# Patient Record
Sex: MALE | Race: WHITE | Employment: FULL TIME | URBAN - NONMETROPOLITAN AREA
[De-identification: names, ages, dates, MRNs, and addresses within clinical notes are randomized per-mention and may not be internally consistent; named-entity substitution may affect disease eponyms.]

---

## 2024-01-21 ENCOUNTER — HOSPITAL ENCOUNTER (INPATIENT)
Age: 44
LOS: 1 days | Discharge: HOME | DRG: 322 | End: 2024-01-22
Attending: INTERNAL MEDICINE | Admitting: INTERNAL MEDICINE
Payer: COMMERCIAL

## 2024-01-21 DIAGNOSIS — I21.11 ST ELEVATION MYOCARDIAL INFARCTION INVOLVING RIGHT CORONARY ARTERY (HCC): Primary | ICD-10-CM

## 2024-01-21 DIAGNOSIS — I21.3 STEMI (ST ELEVATION MYOCARDIAL INFARCTION) (HCC): ICD-10-CM

## 2024-01-21 LAB
ACTIVATED CLOTTING TIME: 239 SECONDS (ref 1–150)
BASOPHILS ABSOLUTE: 0 THOU/MM3 (ref 0–0.1)
BASOPHILS NFR BLD AUTO: 0.2 %
DEPRECATED MEAN GLUCOSE BLD GHB EST-ACNC: 96 MG/DL (ref 70–126)
DEPRECATED RDW RBC AUTO: 45.1 FL (ref 35–45)
EKG ATRIAL RATE: 49 BPM
EKG P AXIS: 54 DEGREES
EKG P-R INTERVAL: 162 MS
EKG Q-T INTERVAL: 454 MS
EKG QRS DURATION: 96 MS
EKG QTC CALCULATION (BAZETT): 410 MS
EKG R AXIS: 50 DEGREES
EKG T AXIS: 77 DEGREES
EKG VENTRICULAR RATE: 49 BPM
EOSINOPHIL NFR BLD AUTO: 2.2 %
EOSINOPHILS ABSOLUTE: 0.2 THOU/MM3 (ref 0–0.4)
ERYTHROCYTE [DISTWIDTH] IN BLOOD BY AUTOMATED COUNT: 13.3 % (ref 11.5–14.5)
HBA1C MFR BLD HPLC: 5.2 % (ref 4.4–6.4)
HCT VFR BLD AUTO: 48.9 % (ref 42–52)
HGB BLD-MCNC: 16.2 GM/DL (ref 14–18)
IMM GRANULOCYTES # BLD AUTO: 0.02 THOU/MM3 (ref 0–0.07)
IMM GRANULOCYTES NFR BLD AUTO: 0.2 %
LYMPHOCYTES ABSOLUTE: 2.1 THOU/MM3 (ref 1–4.8)
LYMPHOCYTES NFR BLD AUTO: 25.2 %
MAGNESIUM SERPL-MCNC: 2.3 MG/DL (ref 1.6–2.4)
MCH RBC QN AUTO: 30.3 PG (ref 26–33)
MCHC RBC AUTO-ENTMCNC: 33.1 GM/DL (ref 32.2–35.5)
MCV RBC AUTO: 91.6 FL (ref 80–94)
MONOCYTES ABSOLUTE: 0.6 THOU/MM3 (ref 0.4–1.3)
MONOCYTES NFR BLD AUTO: 6.7 %
MRSA DNA SPEC QL NAA+PROBE: NEGATIVE
NEUTROPHILS NFR BLD AUTO: 65.5 %
NRBC BLD AUTO-RTO: 0 /100 WBC
PLATELET # BLD AUTO: 178 THOU/MM3 (ref 130–400)
PMV BLD AUTO: 9.8 FL (ref 9.4–12.4)
RBC # BLD AUTO: 5.34 MILL/MM3 (ref 4.7–6.1)
SEGMENTED NEUTROPHILS ABSOLUTE COUNT: 5.4 THOU/MM3 (ref 1.8–7.7)
TSH SERPL DL<=0.005 MIU/L-ACNC: 0.69 UIU/ML (ref 0.4–4.2)
WBC # BLD AUTO: 8.3 THOU/MM3 (ref 4.8–10.8)

## 2024-01-21 PROCEDURE — 87070 CULTURE OTHR SPECIMN AEROBIC: CPT

## 2024-01-21 PROCEDURE — B2111ZZ FLUOROSCOPY OF MULTIPLE CORONARY ARTERIES USING LOW OSMOLAR CONTRAST: ICD-10-PCS | Performed by: STUDENT IN AN ORGANIZED HEALTH CARE EDUCATION/TRAINING PROGRAM

## 2024-01-21 PROCEDURE — 99152 MOD SED SAME PHYS/QHP 5/>YRS: CPT | Performed by: INTERNAL MEDICINE

## 2024-01-21 PROCEDURE — C1769 GUIDE WIRE: HCPCS | Performed by: INTERNAL MEDICINE

## 2024-01-21 PROCEDURE — 93010 ELECTROCARDIOGRAM REPORT: CPT | Performed by: NUCLEAR MEDICINE

## 2024-01-21 PROCEDURE — C1874 STENT, COATED/COV W/DEL SYS: HCPCS | Performed by: INTERNAL MEDICINE

## 2024-01-21 PROCEDURE — 93458 L HRT ARTERY/VENTRICLE ANGIO: CPT | Performed by: INTERNAL MEDICINE

## 2024-01-21 PROCEDURE — 93005 ELECTROCARDIOGRAM TRACING: CPT | Performed by: INTERNAL MEDICINE

## 2024-01-21 PROCEDURE — 2580000003 HC RX 258: Performed by: INTERNAL MEDICINE

## 2024-01-21 PROCEDURE — 83735 ASSAY OF MAGNESIUM: CPT

## 2024-01-21 PROCEDURE — 2000000000 HC ICU R&B

## 2024-01-21 PROCEDURE — 6360000004 HC RX CONTRAST MEDICATION: Performed by: INTERNAL MEDICINE

## 2024-01-21 PROCEDURE — 027034Z DILATION OF CORONARY ARTERY, ONE ARTERY WITH DRUG-ELUTING INTRALUMINAL DEVICE, PERCUTANEOUS APPROACH: ICD-10-PCS | Performed by: STUDENT IN AN ORGANIZED HEALTH CARE EDUCATION/TRAINING PROGRAM

## 2024-01-21 PROCEDURE — 85347 COAGULATION TIME ACTIVATED: CPT

## 2024-01-21 PROCEDURE — 99223 1ST HOSP IP/OBS HIGH 75: CPT | Performed by: INTERNAL MEDICINE

## 2024-01-21 PROCEDURE — 83036 HEMOGLOBIN GLYCOSYLATED A1C: CPT

## 2024-01-21 PROCEDURE — C1725 CATH, TRANSLUMIN NON-LASER: HCPCS | Performed by: INTERNAL MEDICINE

## 2024-01-21 PROCEDURE — 6360000002 HC RX W HCPCS: Performed by: INTERNAL MEDICINE

## 2024-01-21 PROCEDURE — 85025 COMPLETE CBC W/AUTO DIFF WBC: CPT

## 2024-01-21 PROCEDURE — C9606 PERC D-E COR REVASC W AMI S: HCPCS | Performed by: INTERNAL MEDICINE

## 2024-01-21 PROCEDURE — 92941 PRQ TRLML REVSC TOT OCCL AMI: CPT | Performed by: INTERNAL MEDICINE

## 2024-01-21 PROCEDURE — 87641 MR-STAPH DNA AMP PROBE: CPT

## 2024-01-21 PROCEDURE — 84443 ASSAY THYROID STIM HORMONE: CPT

## 2024-01-21 PROCEDURE — 6370000000 HC RX 637 (ALT 250 FOR IP): Performed by: INTERNAL MEDICINE

## 2024-01-21 PROCEDURE — 99291 CRITICAL CARE FIRST HOUR: CPT | Performed by: INTERNAL MEDICINE

## 2024-01-21 PROCEDURE — 2709999900 HC NON-CHARGEABLE SUPPLY: Performed by: INTERNAL MEDICINE

## 2024-01-21 PROCEDURE — C1887 CATHETER, GUIDING: HCPCS | Performed by: INTERNAL MEDICINE

## 2024-01-21 PROCEDURE — 2500000003 HC RX 250 WO HCPCS: Performed by: INTERNAL MEDICINE

## 2024-01-21 PROCEDURE — C1894 INTRO/SHEATH, NON-LASER: HCPCS | Performed by: INTERNAL MEDICINE

## 2024-01-21 PROCEDURE — 99153 MOD SED SAME PHYS/QHP EA: CPT | Performed by: INTERNAL MEDICINE

## 2024-01-21 PROCEDURE — 4A023N7 MEASUREMENT OF CARDIAC SAMPLING AND PRESSURE, LEFT HEART, PERCUTANEOUS APPROACH: ICD-10-PCS | Performed by: STUDENT IN AN ORGANIZED HEALTH CARE EDUCATION/TRAINING PROGRAM

## 2024-01-21 DEVICE — STENT ONYXNG35008UX ONYX 3.50X08RX
Type: IMPLANTABLE DEVICE | Status: FUNCTIONAL
Brand: ONYX FRONTIER™

## 2024-01-21 RX ORDER — SODIUM CHLORIDE 0.9 % (FLUSH) 0.9 %
5-40 SYRINGE (ML) INJECTION PRN
Status: DISCONTINUED | OUTPATIENT
Start: 2024-01-21 | End: 2024-01-22 | Stop reason: HOSPADM

## 2024-01-21 RX ORDER — TESTOSTERONE CYPIONATE 200 MG/ML
200 INJECTION, SOLUTION INTRAMUSCULAR WEEKLY
COMMUNITY
Start: 2022-10-28

## 2024-01-21 RX ORDER — METOPROLOL SUCCINATE 25 MG/1
25 TABLET, EXTENDED RELEASE ORAL DAILY
Status: DISCONTINUED | OUTPATIENT
Start: 2024-01-21 | End: 2024-01-22 | Stop reason: HOSPADM

## 2024-01-21 RX ORDER — ACETAMINOPHEN 325 MG/1
650 TABLET ORAL EVERY 4 HOURS PRN
Status: DISCONTINUED | OUTPATIENT
Start: 2024-01-21 | End: 2024-01-22 | Stop reason: HOSPADM

## 2024-01-21 RX ORDER — ATORVASTATIN CALCIUM 80 MG/1
80 TABLET, FILM COATED ORAL NIGHTLY
Status: DISCONTINUED | OUTPATIENT
Start: 2024-01-21 | End: 2024-01-22 | Stop reason: HOSPADM

## 2024-01-21 RX ORDER — SODIUM CHLORIDE 0.9 % (FLUSH) 0.9 %
5-40 SYRINGE (ML) INJECTION EVERY 12 HOURS SCHEDULED
Status: DISCONTINUED | OUTPATIENT
Start: 2024-01-21 | End: 2024-01-22 | Stop reason: HOSPADM

## 2024-01-21 RX ORDER — DEXTROAMPHETAMINE SACCHARATE, AMPHETAMINE ASPARTATE MONOHYDRATE, DEXTROAMPHETAMINE SULFATE AND AMPHETAMINE SULFATE 5; 5; 5; 5 MG/1; MG/1; MG/1; MG/1
2 CAPSULE, EXTENDED RELEASE ORAL DAILY
COMMUNITY
Start: 2024-01-17

## 2024-01-21 RX ORDER — NITROGLYCERIN 20 MG/100ML
INJECTION INTRAVENOUS CONTINUOUS PRN
Status: COMPLETED | OUTPATIENT
Start: 2024-01-21 | End: 2024-01-21

## 2024-01-21 RX ORDER — HEPARIN SODIUM 1000 [USP'U]/ML
INJECTION, SOLUTION INTRAVENOUS; SUBCUTANEOUS PRN
Status: DISCONTINUED | OUTPATIENT
Start: 2024-01-21 | End: 2024-01-21 | Stop reason: HOSPADM

## 2024-01-21 RX ORDER — ASPIRIN 81 MG/1
81 TABLET, CHEWABLE ORAL DAILY
Status: DISCONTINUED | OUTPATIENT
Start: 2024-01-22 | End: 2024-01-22 | Stop reason: HOSPADM

## 2024-01-21 RX ORDER — DIPHENHYDRAMINE HYDROCHLORIDE 50 MG/ML
INJECTION INTRAMUSCULAR; INTRAVENOUS PRN
Status: DISCONTINUED | OUTPATIENT
Start: 2024-01-21 | End: 2024-01-21 | Stop reason: HOSPADM

## 2024-01-21 RX ORDER — ROSUVASTATIN CALCIUM 20 MG/1
20 TABLET, COATED ORAL DAILY
Status: ON HOLD | COMMUNITY
Start: 2022-10-19 | End: 2024-01-22 | Stop reason: HOSPADM

## 2024-01-21 RX ORDER — SODIUM CHLORIDE 9 MG/ML
INJECTION, SOLUTION INTRAVENOUS PRN
Status: DISCONTINUED | OUTPATIENT
Start: 2024-01-21 | End: 2024-01-22 | Stop reason: HOSPADM

## 2024-01-21 RX ADMIN — SODIUM CHLORIDE, PRESERVATIVE FREE 10 ML: 5 INJECTION INTRAVENOUS at 20:08

## 2024-01-21 RX ADMIN — TICAGRELOR 90 MG: 90 TABLET ORAL at 20:07

## 2024-01-21 RX ADMIN — METOPROLOL SUCCINATE 25 MG: 25 TABLET, EXTENDED RELEASE ORAL at 10:26

## 2024-01-21 RX ADMIN — ATORVASTATIN CALCIUM 80 MG: 80 TABLET, FILM COATED ORAL at 20:07

## 2024-01-21 NOTE — H&P
[facility]  H and P    Patient's Name/Date of Birth: Soham Reyez / 1980 (43 y.o.)    Date: January 21, 2024     Referring Provider: Osman Heck MD    CHIEF COMPLAINT: STEMI      HPI: This is a pleasant 43 y.o. male with hx of prior drug use on suboxone who presents for evaluation of chest pain.  He has had chest pain since 3 AM with some mild radiation.  Did have jaw pain as well.  No sob or diaphoresis.  No nausea.  ECG has been showing evolving ST changes in the inferior leads.  Around 5 AM, there was clear ST elevation with bradycardia.  No LOC or syncope.  Was given Heparin IV, ASA.  No prior MI or stents.  No contrast allergy.  No pleurisy, f/c/ns.  Uses MJ.  Cr 1.3.   Trop negative. Still having chest discomfort.         Echo: No results found for this or any previous visit.       All labs, EKG's, diagnostic testing and images as well as cardiac cath, stress testing were reviewed during this encounter    Past Medical History:   Diagnosis Date    Psychiatric problem      Past Surgical History:   Procedure Laterality Date    APPENDECTOMY       No current facility-administered medications for this encounter.     Prior to Admission medications    Medication Sig Start Date End Date Taking? Authorizing Provider   citalopram (CELEXA) 20 MG tablet Take 1 tablet by mouth daily. 9/20/13   Mark Stevenson MD   traZODone (DESYREL) 100 MG tablet Take 1 tablet by mouth nightly. 9/20/13   Mark Stevenson MD   folic acid (FOLVITE) 1 MG tablet Take 1 tablet by mouth daily. 9/20/13   Mark Stevenson MD   therapeutic multivitamin-minerals (THERAGRAN-M) tablet Take 1 tablet by mouth daily. 9/20/13   Mark Stevenson MD   pantoprazole (PROTONIX) 40 MG tablet Take 1 tablet by mouth every morning (before breakfast). 9/20/13   Mark Stevenson MD   vitamin B-1 100 MG tablet Take 1 tablet by mouth daily. 9/20/13   Mark Stevenson MD   Scheduled Meds:  Continuous Infusions:  PRN 
Patient:  Soham Reyez    Unit/Bed:4D-16/016-A  MRN: [id number]   PCP: No primary care provider on file.  Date of Admission: 1/21/2024    Assessment and Plan(All pulmonary edema, renal failure, PE, and respiratory failure diagnoses are acute in nature unless otherwise specified):        STEMI:  Obstructive CAD s/p LHC: R PLB STEMI x 1 VICKIE 1/21/2024. No prior history of MI or stents. Pt is a current 1.5 PPD smoker. Pt has HLD, takes crestor 20 mg daily. No family history of early onset heart disease. Initial EKG w/ evolving ST segment elevation in inferior leads with bradycardia. Neg trops.   DAPT w/ ASA 81 mg daily and Brilinta 90 mg BID   BB - Toprol XL 25 mg daily   Lipitor 80 mg daily   Cardiac rehab  Tobacco cessation counseling  HLD: Patient takes Crestor 20 mg daily.  Patient started on Lipitor 80 mg.  Lipid panel ordered for tomorrow a.m.  History of opioid abuse disorder: Patient takes buprenorphine SL daily.  Currently held.  Tobacco abuse disorder: 1.5 PPD.  Recommend smoking cessation.  Cessation counseling.  Hypogonadism: Patient takes testosterone 200 mg/mL, 1 mL IM weekly.  Anxiety/depression: Patient previously on Celexa.  Patient no longer takes this.  ADD: Patient takes Adderall XR 40 mg.     CC:  Chest pain   HPI: Patient is a 43-year-old male with a PMH notable for HLD, opioid abuse disorder, tobacco abuse disorder, hypogonadism, depression, and ADD.  Patient states that at 3 AM he was woken up by Central substernal chest pain that radiated to his left jaw.  Patient denied radiation to his arms.  Patient also endorsed diaphoresis.  He denied nausea or vomiting.  Patient states that he has no known history of heart disease.  No family history of early onset heart disease.  Of note patient is a previous opioid abuser currently on buprenorphine.  Patient also smokes 1.5 PPD.   ROS: Denies current chest pain, shortness of breath, cough, changes in bowel or bladder, headache, numbness or tingling in 
and wished to proceed; the consent form was signed.        MEDICAL HISTORY   has a past medical history of Psychiatric problem.    SURGICAL HISTORY   has a past surgical history that includes Appendectomy.  Additional information:       ALLERGIES   Allergies as of 01/21/2024    (No Known Allergies)     Additional information:       MEDICATIONS   No current facility-administered medications for this encounter.  Prior to Admission medications    Medication Sig Start Date End Date Taking? Authorizing Provider   citalopram (CELEXA) 20 MG tablet Take 1 tablet by mouth daily. 9/20/13   Mark Stevenson MD   traZODone (DESYREL) 100 MG tablet Take 1 tablet by mouth nightly. 9/20/13   Mark Stevenson MD   folic acid (FOLVITE) 1 MG tablet Take 1 tablet by mouth daily. 9/20/13   Mark Stevenson MD   therapeutic multivitamin-minerals (THERAGRAN-M) tablet Take 1 tablet by mouth daily. 9/20/13   Mark Stevenson MD   pantoprazole (PROTONIX) 40 MG tablet Take 1 tablet by mouth every morning (before breakfast). 9/20/13   Mark Stevenson MD   vitamin B-1 100 MG tablet Take 1 tablet by mouth daily. 9/20/13   Mark Stevenson MD     Additional information:       VITAL SIGNS   There were no vitals filed for this visit.    PHYSICAL:   General: No acute distress  HEENT:  Unremarkable for age  Neck: without increased JVD, carotid pulses 2+ bilaterally without bruits  Heart: RRR, S1 & S2 WNL, S4 gallop, without murmurs or rubs   NYHA: 2  Lungs: Clear to auscultation    Abdomen: BS present, without HSM, masses, or tenderness    Extremities: without C,C,E.  Pulses 2+ bilaterally  Mental Status: Alert & Oriented        PLANNED PROCEDURE   [x]Cath  [x]PCI                []Pacemaker/AICD  []YOLIS             []Cardioversion []Peripheral angiography/PTA  []Other:      SEDATION  Planned agent:[x]Midazolam []Meperidine [x]Sublimaze []Morphine  []Diazepam  []Other:     ASA Classification:  []1 []2 []3 [x]4 []5  Class 1: A normal healthy

## 2024-01-21 NOTE — PLAN OF CARE
Patient is resting comfortably and pain free.  Educated on warning signs of cardiac symptoms.   Patient is resting comfortably.

## 2024-01-22 ENCOUNTER — APPOINTMENT (OUTPATIENT)
Age: 44
DRG: 322 | End: 2024-01-22
Attending: INTERNAL MEDICINE
Payer: COMMERCIAL

## 2024-01-22 VITALS
BODY MASS INDEX: 28.18 KG/M2 | HEIGHT: 74 IN | WEIGHT: 219.58 LBS | DIASTOLIC BLOOD PRESSURE: 72 MMHG | TEMPERATURE: 98.1 F | OXYGEN SATURATION: 98 % | RESPIRATION RATE: 16 BRPM | HEART RATE: 62 BPM | SYSTOLIC BLOOD PRESSURE: 122 MMHG

## 2024-01-22 LAB
ANION GAP SERPL CALC-SCNC: 10 MEQ/L (ref 8–16)
BUN SERPL-MCNC: 10 MG/DL (ref 7–22)
CALCIUM SERPL-MCNC: 8.6 MG/DL (ref 8.5–10.5)
CHLORIDE SERPL-SCNC: 111 MEQ/L (ref 98–111)
CHOLEST SERPL-MCNC: 114 MG/DL (ref 100–199)
CO2 SERPL-SCNC: 20 MEQ/L (ref 23–33)
CREAT SERPL-MCNC: 1 MG/DL (ref 0.4–1.2)
DEPRECATED RDW RBC AUTO: 46.6 FL (ref 35–45)
ECHO AO ASC DIAM: 3.2 CM
ECHO AO ASCENDING AORTA INDEX: 1.42 CM/M2
ECHO AV CUSP MM: 2.3 CM
ECHO AV PEAK GRADIENT: 7 MMHG
ECHO AV PEAK VELOCITY: 1.3 M/S
ECHO AV VELOCITY RATIO: 0.77
ECHO BSA: 2.28 M2
ECHO LA AREA 2C: 13.9 CM2
ECHO LA AREA 4C: 15.9 CM2
ECHO LA DIAMETER INDEX: 1.55 CM/M2
ECHO LA DIAMETER: 3.5 CM
ECHO LA MAJOR AXIS: 5.7 CM
ECHO LA MINOR AXIS: 4.8 CM
ECHO LA VOL BP: 37 ML (ref 18–58)
ECHO LA VOL MOD A2C: 33 ML (ref 18–58)
ECHO LA VOL MOD A4C: 36 ML (ref 18–58)
ECHO LA VOL/BSA BIPLANE: 16 ML/M2 (ref 16–34)
ECHO LA VOLUME INDEX MOD A2C: 15 ML/M2 (ref 16–34)
ECHO LA VOLUME INDEX MOD A4C: 16 ML/M2 (ref 16–34)
ECHO LV E' LATERAL VELOCITY: 16 CM/S
ECHO LV E' SEPTAL VELOCITY: 9 CM/S
ECHO LV FRACTIONAL SHORTENING: 27 % (ref 28–44)
ECHO LV INTERNAL DIMENSION DIASTOLE INDEX: 2.65 CM/M2
ECHO LV INTERNAL DIMENSION DIASTOLIC: 6 CM (ref 4.2–5.9)
ECHO LV INTERNAL DIMENSION SYSTOLIC INDEX: 1.95 CM/M2
ECHO LV INTERNAL DIMENSION SYSTOLIC: 4.4 CM
ECHO LV ISOVOLUMETRIC RELAXATION TIME (IVRT): 70 MS
ECHO LV IVSD: 0.9 CM (ref 0.6–1)
ECHO LV MASS 2D: 200.7 G (ref 88–224)
ECHO LV MASS INDEX 2D: 88.8 G/M2 (ref 49–115)
ECHO LV POSTERIOR WALL DIASTOLIC: 0.8 CM (ref 0.6–1)
ECHO LV RELATIVE WALL THICKNESS RATIO: 0.27
ECHO LVOT PEAK GRADIENT: 4 MMHG
ECHO LVOT PEAK VELOCITY: 1 M/S
ECHO MV A VELOCITY: 0.37 M/S
ECHO MV E DECELERATION TIME (DT): 218 MS
ECHO MV E VELOCITY: 0.66 M/S
ECHO MV E/A RATIO: 1.78
ECHO MV E/E' LATERAL: 4.13
ECHO MV E/E' RATIO (AVERAGED): 5.73
ECHO PV MAX VELOCITY: 0.9 M/S
ECHO PV PEAK GRADIENT: 3 MMHG
ECHO RV INTERNAL DIMENSION: 3.3 CM
ECHO RV TAPSE: 2.5 CM (ref 1.7–?)
ECHO TV E WAVE: 0.6 M/S
ERYTHROCYTE [DISTWIDTH] IN BLOOD BY AUTOMATED COUNT: 13.5 % (ref 11.5–14.5)
GFR SERPL CREATININE-BSD FRML MDRD: > 60 ML/MIN/1.73M2
GLUCOSE SERPL-MCNC: 105 MG/DL (ref 70–108)
HCT VFR BLD AUTO: 51.6 % (ref 42–52)
HDLC SERPL-MCNC: 24 MG/DL
HGB BLD-MCNC: 16.5 GM/DL (ref 14–18)
LDLC SERPL CALC-MCNC: 72 MG/DL
MCH RBC QN AUTO: 29.7 PG (ref 26–33)
MCHC RBC AUTO-ENTMCNC: 32 GM/DL (ref 32.2–35.5)
MCV RBC AUTO: 93 FL (ref 80–94)
PLATELET # BLD AUTO: 175 THOU/MM3 (ref 130–400)
PMV BLD AUTO: 10.1 FL (ref 9.4–12.4)
POTASSIUM SERPL-SCNC: 4.5 MEQ/L (ref 3.5–5.2)
RBC # BLD AUTO: 5.55 MILL/MM3 (ref 4.7–6.1)
SODIUM SERPL-SCNC: 141 MEQ/L (ref 135–145)
TRIGL SERPL-MCNC: 92 MG/DL (ref 0–199)
WBC # BLD AUTO: 7.3 THOU/MM3 (ref 4.8–10.8)

## 2024-01-22 PROCEDURE — 99239 HOSP IP/OBS DSCHRG MGMT >30: CPT | Performed by: INTERNAL MEDICINE

## 2024-01-22 PROCEDURE — 99232 SBSQ HOSP IP/OBS MODERATE 35: CPT | Performed by: INTERNAL MEDICINE

## 2024-01-22 PROCEDURE — 93306 TTE W/DOPPLER COMPLETE: CPT

## 2024-01-22 PROCEDURE — 94761 N-INVAS EAR/PLS OXIMETRY MLT: CPT

## 2024-01-22 PROCEDURE — 93306 TTE W/DOPPLER COMPLETE: CPT | Performed by: INTERNAL MEDICINE

## 2024-01-22 PROCEDURE — 2580000003 HC RX 258: Performed by: INTERNAL MEDICINE

## 2024-01-22 PROCEDURE — 85027 COMPLETE CBC AUTOMATED: CPT

## 2024-01-22 PROCEDURE — 80048 BASIC METABOLIC PNL TOTAL CA: CPT

## 2024-01-22 PROCEDURE — 80061 LIPID PANEL: CPT

## 2024-01-22 PROCEDURE — 36415 COLL VENOUS BLD VENIPUNCTURE: CPT

## 2024-01-22 PROCEDURE — 99232 SBSQ HOSP IP/OBS MODERATE 35: CPT | Performed by: STUDENT IN AN ORGANIZED HEALTH CARE EDUCATION/TRAINING PROGRAM

## 2024-01-22 PROCEDURE — 6370000000 HC RX 637 (ALT 250 FOR IP): Performed by: INTERNAL MEDICINE

## 2024-01-22 RX ORDER — NITROGLYCERIN 0.4 MG/1
0.4 TABLET SUBLINGUAL EVERY 5 MIN PRN
Status: DISCONTINUED | OUTPATIENT
Start: 2024-01-22 | End: 2024-01-22 | Stop reason: HOSPADM

## 2024-01-22 RX ORDER — METOPROLOL SUCCINATE 25 MG/1
25 TABLET, EXTENDED RELEASE ORAL DAILY
Qty: 30 TABLET | Refills: 3 | Status: SHIPPED | OUTPATIENT
Start: 2024-01-23

## 2024-01-22 RX ORDER — ASPIRIN 81 MG/1
81 TABLET, CHEWABLE ORAL DAILY
Qty: 30 TABLET | Refills: 3 | Status: SHIPPED | OUTPATIENT
Start: 2024-01-23

## 2024-01-22 RX ORDER — ATORVASTATIN CALCIUM 80 MG/1
80 TABLET, FILM COATED ORAL NIGHTLY
Qty: 30 TABLET | Refills: 3 | Status: SHIPPED | OUTPATIENT
Start: 2024-01-22

## 2024-01-22 RX ORDER — NITROGLYCERIN 0.4 MG/1
0.4 TABLET SUBLINGUAL EVERY 5 MIN PRN
Qty: 25 TABLET | Refills: 3 | Status: SHIPPED | OUTPATIENT
Start: 2024-01-22

## 2024-01-22 RX ADMIN — SODIUM CHLORIDE, PRESERVATIVE FREE 10 ML: 5 INJECTION INTRAVENOUS at 09:30

## 2024-01-22 RX ADMIN — ASPIRIN 81 MG 81 MG: 81 TABLET ORAL at 09:30

## 2024-01-22 RX ADMIN — TICAGRELOR 90 MG: 90 TABLET ORAL at 09:30

## 2024-01-22 NOTE — DISCHARGE SUMMARY
For convenience and continuity at follow-up the following most recent labs are provided:      CBC:    Lab Results   Component Value Date/Time    WBC 7.3 01/22/2024 05:25 AM    HGB 16.5 01/22/2024 05:25 AM    HCT 51.6 01/22/2024 05:25 AM     01/22/2024 05:25 AM       Renal:    Lab Results   Component Value Date/Time     01/22/2024 05:25 AM    K 4.5 01/22/2024 05:25 AM     01/22/2024 05:25 AM    CO2 20 01/22/2024 05:25 AM    BUN 10 01/22/2024 05:25 AM    CREATININE 1.0 01/22/2024 05:25 AM    CALCIUM 8.6 01/22/2024 05:25 AM         Significant Diagnostic Studies    Radiology:   No orders to display          Consults:     IP CONSULT TO CARDIAC REHAB  IP CONSULT TO DIETITIAN    Disposition: Home  Condition at Discharge: Stable    Code Status:  Full Code     Patient Instructions:    Discharge lab work: N/A  Activity: activity as tolerated  Diet: ADULT DIET; Regular; Low Fat/Low Chol/High Fiber/LUCIANA      Follow-up visits:   Mortimer, Connor, PA-C  [address]  [facility] [address]  [phone]    Go on 2/7/2024  8:30am    Vicente Cardoaz MD  [address]  [facility] [address] [phone]    Schedule an appointment as soon as possible for a visit in 1 week(s)  office will call patient         Discharge Medications:        Medication List        START taking these medications      aspirin 81 MG chewable tablet  Take 1 tablet by mouth daily  Start taking on: January 23, 2024     atorvastatin 80 MG tablet  Commonly known as: LIPITOR  Take 1 tablet by mouth nightly     metoprolol succinate 25 MG extended release tablet  Commonly known as: TOPROL XL  Take 1 tablet by mouth daily  Start taking on: January 23, 2024     nitroGLYCERIN 0.4 MG SL tablet  Commonly known as: NITROSTAT  Place 1 tablet under the tongue every 5 minutes as needed for Chest pain up to max of 3 total doses. If no relief after 1 dose, call 911.     ticagrelor 90 MG Tabs tablet  Commonly known as: BRILINTA  Take

## 2024-01-22 NOTE — PROGRESS NOTES
CLINICAL PHARMACY: DISCHARGE MED RECONCILIATION/REVIEW    [facility] Patient?: No  Total # of Interventions Recommended: 0  Total # Interventions Accepted: 0  Intervention Severity:   - Level 1 Intervention Present?: No   - Level 2 #: 0   - Level 3 #: 0   Time Spent (min): 15    Additional Documentation:   
Discussed discharge summary with patient and patients mom. Instructed patient about medications & follow up appointments. Patient denies any additional questions. Patient was discharged with all belongings. Patient received meds to beds. No distress noted. Patient discharged to home. Educated on post cath site care.    
Heart attack teaching covered with patient and/or family or significant other:  Signs and symptoms of a heart attack.  When to call 911 and the importance of calling 911.  Personal risk factors and ways to lower their risk.  4.   Importance of quitting smoking if applicable.     Heart attack booklet given to the patient and/or family or significant other. Reviewed:  How to take Nitroglycerin.  The importance of participating in Cardiac Rehab and hours of operation.   Heart Healthy Diet.  Risk factor modification.(Overweight, Obesity, Diabetes, Hypertension, Smoking, High Cholesterol, Stress)  Discharge instructions for Cath/Intervention procedure site if applicable.         Stent card given to patient.       
Inpatient Cardiac Rehabilitation Consult    Received consult for Phase II Cardiac Rehabilitation.  Patient needs cardiac rehab due to STEMI / PCI on 1/21/24.  Importance of Cardiac Rehab discussed with patient.  Reviewed cardiac rehab class times.  Patient questions answered.  Referral sent to [facility]. Cardiac Rehab brochure given.        
MI Documentation    MI: : STEMI    PCI: YES    EF: 50-55%  ASA w/i first 24 hours: YES   BB w/i first 24 hours: YES       ------------------------------------------  1.  ASA: YES  2.  BB: YES  3.  ACE/ARB: CONTRAINDICATION labile BP  4.  Statin: YES  5.  P2Y12: YES      
Patient:  Soham Reyez    Unit/Bed:4D-16/016-A  MRN: [id number]   PCP: No primary care provider on file.  Date of Admission: 1/21/2024    Assessment and Plan(All pulmonary edema, renal failure, PE, and respiratory failure diagnoses are acute in nature unless otherwise specified):        STEMI:  Obstructive CAD s/p LHC: R PLB STEMI x 1 VICKIE 1/21/2024. No prior history of MI or stents. Pt is a current 1.5 PPD smoker. Pt has HLD, takes crestor 20 mg daily. No family history of early onset heart disease. Initial EKG w/ evolving ST segment elevation in inferior leads with bradycardia. Neg trops.   DAPT w/ ASA 81 mg daily and Brilinta 90 mg BID   BB - Toprol XL 25 mg daily   Lipitor 80 mg daily   Cardiac rehab  Tobacco cessation counseling  HLD: Patient takes Crestor 20 mg daily.  Patient started on Lipitor 80 mg.  Lipid panel ordered for tomorrow a.m.  History of opioid abuse disorder: Patient takes buprenorphine SL daily.  Currently held.  Tobacco abuse disorder: 1.5 PPD.  Recommend smoking cessation.  Cessation counseling.  Hypogonadism: Patient takes testosterone 200 mg/mL, 1 mL IM weekly.  Anxiety/depression: Patient previously on Celexa.  Patient no longer takes this.  ADD: Patient takes Adderall XR 40 mg.     CC:  Chest pain   HPI: Patient is a 43-year-old male with a PMH notable for HLD, opioid abuse disorder, tobacco abuse disorder, hypogonadism, depression, and ADD.  Patient states that at 3 AM he was woken up by Central substernal chest pain that radiated to his left jaw.  Patient denied radiation to his arms.  Patient also endorsed diaphoresis.  He denied nausea or vomiting.  Patient states that he has no known history of heart disease.  No family history of early onset heart disease.  Of note patient is a previous opioid abuser currently on buprenorphine.  Patient also smokes 1.5 PPD.     ROS: Denies current chest pain, shortness of breath, cough, changes in bowel or bladder, headache, numbness or tingling in 
Pharmacy Medication History Note      List of current medications patient is taking is complete.    Source of information: pt, dispense hx    Changes made to medication list:  Medications removed (include reason, ex. therapy complete or physician discontinued):  Citalopram 20 mg - pt not taking  Folic acid 1 mg - pt not taking  Pantoprazole 40 mg - pt not taking  Trazodone 100 mg - pt not taking  Vitamin B1 100 mg - pt not taking    Medications added/doses adjusted:  none    Other notes (ex. Recent course of antibiotics, Coumadin dosing):  Per patient, gets testosterone injections on Sundays; last got a dose 1/14/24  Denies use of other OTC or herbal medications.      Allergies reviewed      Electronically signed by Birdie Carrington on 1/22/2024 at 10:26 AM     
[facility]   PROGRESS NOTE      Patient: Soham Reyez  Room #: 4D-16/016-A            YOB: 1980  Age: 43 y.o.  Gender: male            Admit Date & Time: 1/21/2024  5:32 AM    Assessment:    The patient declined a visit at this time. The family welcomed a visit though.     Interventions:  The patient was provided information about Spiritual Care being available.     Outcomes:  The  politely wished the patient a positive day.     Plan:  1.Spiritual care will continue to follow the patient according to [facility] spiritual care SOP.       Electronically signed by Chaplain Willis, on 1/21/2024 at 1:41 PM.  Spiritual Care Department  [facility]  [phone]     01/21/24 1340   Encounter Summary   Encounter Overview/Reason  Initial Encounter   Service Provided For: Patient;Family;Patient and family together;Significant other   [facility]   Support System Family members   Last Encounter  01/21/24   Complexity of Encounter Low   Begin Time 1305   End Time  1310   Total Time Calculated 5 min   Spiritual/Emotional needs   Type Spiritual Support   Assessment/Intervention/Outcome   Assessment Coping   Intervention Empowerment   Outcome Refused/Declined       
Report called to Ariane RIDLEY all questions answered. VSS Pt AAOX4 on room air. Transported via wheelchair.  
   RBC 5.55 01/22/2024 05:25 AM    HGB 16.5 01/22/2024 05:25 AM    HCT 51.6 01/22/2024 05:25 AM     01/22/2024 05:25 AM       CMP:    Lab Results   Component Value Date/Time     01/22/2024 05:25 AM    K 4.5 01/22/2024 05:25 AM     01/22/2024 05:25 AM    CO2 20 01/22/2024 05:25 AM    BUN 10 01/22/2024 05:25 AM    CREATININE 1.0 01/22/2024 05:25 AM    LABGLOM >60 01/22/2024 05:25 AM    GLUCOSE 105 01/22/2024 05:25 AM    CALCIUM 8.6 01/22/2024 05:25 AM       Hepatic Function Panel:    Lab Results   Component Value Date/Time    ALKPHOS 70 09/17/2013 11:15 PM    ALT 12 09/17/2013 11:15 PM    AST 16 09/17/2013 11:15 PM    PROT 7.1 09/17/2013 11:15 PM    BILITOT 0.4 09/17/2013 11:15 PM    BILIDIR 0.1 09/17/2013 11:15 PM    LABALBU 4.2 09/17/2013 11:15 PM       Magnesium:    Lab Results   Component Value Date/Time    MG 2.3 01/21/2024 01:40 PM       PT/INR:  No results found for: \"PROTIME\", \"INR\"    HgBA1c:    Lab Results   Component Value Date/Time    LABA1C 5.2 01/21/2024 01:40 PM       FLP:    Lab Results   Component Value Date/Time    TRIG 92 01/22/2024 05:25 AM    HDL 24 01/22/2024 05:25 AM    LDLCALC 72 01/22/2024 05:25 AM       TSH:    Lab Results   Component Value Date/Time    TSH 0.687 01/21/2024 01:40 PM         Assessment:  Inferior STEMI s/p PCI to R PLB x 1 VICKIE  Preserved EF per cath--echo pending   Hx of opioid use disorder--taking suboxone currently  Tobacco abuse disorder  HLD  DONNIE/MDD  Hypogonadism--on weekly testosterone  Sinus bradycardia--intermittent, slower rates overnight with no prolonged pauses, aVB etc      Plan:  ACS Guidelines  ASA/Plavix/brilinta-yes, ASA/brilinta  Statin-yes  BB-yes  ACE/ARB-no. BP is stable, preserved EF. Consider OP if BP higher   Repatha-?   Cardiac Rehab-ordered.   Nitro PRN at d/c    Patient and Practitioner mutually agreed upon goal:   Patient and provider goals: Feel better and have more energy and Start exercise program      Once patient's echo is

## 2024-01-22 NOTE — PLAN OF CARE
Problem: Discharge Planning  Goal: Discharge to home or other facility with appropriate resources  1/22/2024 0426 by Cynthia Pike RN  Outcome: Progressing  Flowsheets (Taken 1/21/2024 2000)  Discharge to home or other facility with appropriate resources: Identify barriers to discharge with patient and caregiver     Problem: Cardiovascular - Adult  Goal: Maintains optimal cardiac output and hemodynamic stability  1/22/2024 0426 by Cynthia Pike RN  Outcome: Progressing  Flowsheets (Taken 1/21/2024 2000)  Maintains optimal cardiac output and hemodynamic stability:   Monitor blood pressure and heart rate   Monitor urine output and notify Licensed Independent Practitioner for values outside of normal range     Problem: Cardiovascular - Adult  Goal: Absence of cardiac dysrhythmias or at baseline  1/22/2024 0426 by Cynthia Pike RN  Outcome: Progressing  Flowsheets (Taken 1/21/2024 2000)  Absence of cardiac dysrhythmias or at baseline:   Monitor cardiac rate and rhythm   Assess for signs of decreased cardiac output     Problem: Musculoskeletal - Adult  Goal: Return mobility to safest level of function  1/22/2024 0426 by Cynthia Pike RN  Outcome: Progressing  Flowsheets (Taken 1/21/2024 2000)  Return Mobility to Safest Level of Function: Assess patient stability and activity tolerance for standing, transferring and ambulating with or without assistive devices     Problem: Musculoskeletal - Adult  Goal: Maintain proper alignment of affected body part  Recent Flowsheet Documentation  Taken 1/21/2024 2000 by Cynthia Pike RN  Maintain proper alignment of affected body part: Support and protect limb and body alignment per provider's orders     Problem: Musculoskeletal - Adult  Goal: Maintain proper alignment of affected body part  1/21/2024 1730 by Alex Ledbetter, RN  Outcome: Progressing     Problem: Musculoskeletal - Adult  Goal: Return ADL status to a safe level of

## 2024-01-22 NOTE — DISCHARGE INSTRUCTIONS
F/u with dr Heck's office in 1-2 weeks.     Do no stop aspirin or brilinta without talking to your doctor.     Discharge Instructions for Radial Heart Catherization    1.  Take it easy for 3-4 days.  2.  No driving for 2 days.  3.  No lifting of 5 lbs or more for 5 days with the affected arm.  4.  Can shower after 24 hours.  5.  Remove arm board after 24 hours.  6.  Apply a band aid to the insertion site daily for 5 days.  May apply antibiotic ointment if desired, but not necessary.  Wash site daily with soap and water.  7.  No creams, ointments, or powders near the insertion site.   8.  No tub baths, swimming, hot tubs, or hand washing dishes for 1 week.  9.  Watch for signs of infection (redness, warmth, swelling, or pus drainage) or coolness of extremity and call physician if this occurs  10.  If bleeding occurs from insertion site, apply pressure and call 911.

## 2024-01-22 NOTE — CARE COORDINATION
Case Management Assessment  Initial Evaluation    Date/Time of Evaluation: 1/22/2024 12:30 PM  Assessment Completed by: Lauren Burton RN    If patient is discharged prior to next notation, then this note serves as note for discharge by case management.    Patient Name: Soham Reyez                   YOB: 1980  Diagnosis: STEMI (ST elevation myocardial infarction) (HCC) [I21.3]                   Date / Time: 1/21/2024  5:32 AM  Location: [address] [place] [address]     Patient Admission Status: Inpatient   Readmission Risk Low 0-14, Mod 15-19), High > 20: Readmission Risk Score: 3.7    Current PCP: Vicente Cardoza MD  PCP verified by CM? Yes (added to EPIC)    Chart Reviewed: Yes      History Provided by: Patient  Patient Orientation: Alert and Oriented    Patient Cognition: Alert    Hospitalization in the last 30 days (Readmission):  No    If yes, Readmission Assessment in CM Navigator will be completed.    Advance Directives:      Code Status: Full Code   Patient's Primary Decision Maker is: Patient Declined (Legal Next of Kin Remains as Decision Maker)      Discharge Planning:    Patient lives with: Alone Type of Home: Apartment  Primary Care Giver: Self  Patient Support Systems include: Parent, Family Members   Current Financial resources: Other (Comment) ([employer])  Current community resources: None  Current services prior to admission: None            Current DME:              Type of Home Care services:  None    ADLS  Prior functional level: Independent in ADLs/IADLs  Current functional level: Independent in ADLs/IADLs    Family can provide assistance at DC: Yes  Would you like Case Management to discuss the discharge plan with any other family members/significant others, and if so, who? No  Plans to Return to Present Housing: Yes  Other Identified Issues/Barriers to RETURNING to current housing: none  Potential Assistance needed at discharge: N/A            Potential DME:    Patient

## 2024-01-23 ENCOUNTER — TELEPHONE (OUTPATIENT)
Dept: CARDIOLOGY CLINIC | Age: 44
End: 2024-01-23

## 2024-01-23 LAB — BACTERIA SPEC AEROBE CULT: NORMAL

## 2024-01-23 NOTE — TELEPHONE ENCOUNTER
[employer]AK[employer] paperwork received and in Dr. Heck's box.   Awaiting approval from Dr. Heck.   LM for patient to call our office back.   Appt 2/7, will he be off work until after that appt?

## 2024-01-23 NOTE — TELEPHONE ENCOUNTER
Pt LM on nurse line.  Chel is faxing [facility] paperwork.  Pt admitted with STEMI over the weekend.

## 2024-01-24 NOTE — TELEPHONE ENCOUNTER
Dr. Heck-STEMI with PCI on 1/21.  He is wanting to go back to work on 1/29/2024, follow-up with [employer] on 2/7.  Are you okay with that.   JAKY started and in Dr. Heck's box for signature.

## 2024-01-24 NOTE — TELEPHONE ENCOUNTER
Patient states he was told he can go back to work on the 1-29-24.  Patient asking for FMLA forms to be filled out start date 1-21-24. Return date 1-29-24.

## 2024-01-25 NOTE — TELEPHONE ENCOUNTER
[facility] paperwork completed, signed, scanned and faxed.   Detailed message left with patient that I faxed form.   I called [employer] and asked to fax to [phone].

## 2024-01-25 NOTE — TELEPHONE ENCOUNTER
Spoke with pt, and he feels GREAT! He wanted to go back to work this week since he feels so good.

## 2024-02-01 NOTE — PROGRESS NOTES
[facility] PROFESSIONAL SERVICES  [facility]   [address]   [address]   [facility] [address]   Dept: [phone]   Dept Fax: [phone]   Loc: [phone]      Chief Complaint   Patient presents with    Follow-Up from Hospital     Stemi. No cardiac complaints      Cardiologist:  Dr. Heck  44 yo male presents for hfu for STEMI s/p PCI to R PLB. Preserved EF. Hx opioid use disorder, tobacco abuse, hypogonadism on testosterone, HLD, DONNIE/MDD.     No chest pain, angina, NEIL, orthopnea, PND, sob at rest, palpitations, LE edema, or syncope.    Feeling well, no bleeding issues since PCI. Had questions about cath and other notes, answered today. Taking testosterone supplements biweekly now instead of weekly. No changes in symptoms lately. Has not checked on brilinta price yet.     General:   No fever, no chills, no weight loss, no fatigue  Pulmonary:    No dyspnea, no wheezing  Cardiac:    Denies recent chest pain   GI:     No nausea or vomiting, no abdominal pain  Neuro:    No dizziness or light headedness  Musculoskeletal:  No recent active issues  Extremities:   No edema      Past Medical History:   Diagnosis Date    Psychiatric problem        No Known Allergies    Current Outpatient Medications   Medication Sig Dispense Refill    aspirin 81 MG chewable tablet Take 1 tablet by mouth daily 30 tablet 3    atorvastatin (LIPITOR) 80 MG tablet Take 1 tablet by mouth nightly 30 tablet 3    metoprolol succinate (TOPROL XL) 25 MG extended release tablet Take 1 tablet by mouth daily 30 tablet 3    nitroGLYCERIN (NITROSTAT) 0.4 MG SL tablet Place 1 tablet under the tongue every 5 minutes as needed for Chest pain up to max of 3 total doses. If no relief after 1 dose, call 911. 25 tablet 3    ticagrelor (BRILINTA) 90 MG TABS tablet Take 1 tablet by mouth 2 times daily 60 tablet 3    amphetamine-dextroamphetamine (ADDERALL XR) 20 MG extended release capsule Take 2 capsules by mouth daily.

## 2024-02-07 ENCOUNTER — OFFICE VISIT (OUTPATIENT)
Dept: CARDIOLOGY CLINIC | Age: 44
End: 2024-02-07
Payer: COMMERCIAL

## 2024-02-07 VITALS
SYSTOLIC BLOOD PRESSURE: 102 MMHG | WEIGHT: 214 LBS | BODY MASS INDEX: 27.46 KG/M2 | HEART RATE: 90 BPM | HEIGHT: 74 IN | DIASTOLIC BLOOD PRESSURE: 70 MMHG

## 2024-02-07 DIAGNOSIS — I25.10 CORONARY ARTERY DISEASE INVOLVING NATIVE CORONARY ARTERY OF NATIVE HEART WITHOUT ANGINA PECTORIS: ICD-10-CM

## 2024-02-07 DIAGNOSIS — I21.11 ST ELEVATION MYOCARDIAL INFARCTION INVOLVING RIGHT CORONARY ARTERY (HCC): Primary | ICD-10-CM

## 2024-02-07 PROCEDURE — 99214 OFFICE O/P EST MOD 30 MIN: CPT | Performed by: STUDENT IN AN ORGANIZED HEALTH CARE EDUCATION/TRAINING PROGRAM

## 2024-02-07 PROCEDURE — 93000 ELECTROCARDIOGRAM COMPLETE: CPT | Performed by: STUDENT IN AN ORGANIZED HEALTH CARE EDUCATION/TRAINING PROGRAM

## 2024-02-07 RX ORDER — ATORVASTATIN CALCIUM 80 MG/1
80 TABLET, FILM COATED ORAL NIGHTLY
Qty: 30 TABLET | Refills: 3 | Status: SHIPPED | OUTPATIENT
Start: 2024-02-07

## 2024-02-07 RX ORDER — METOPROLOL SUCCINATE 25 MG/1
25 TABLET, EXTENDED RELEASE ORAL DAILY
Qty: 30 TABLET | Refills: 3 | Status: SHIPPED | OUTPATIENT
Start: 2024-02-07

## 2024-02-14 RX ORDER — ASPIRIN 81 MG/1
81 TABLET, CHEWABLE ORAL DAILY
Qty: 30 TABLET | Refills: 3 | Status: SHIPPED | OUTPATIENT
Start: 2024-02-14

## 2024-02-14 RX ORDER — METOPROLOL SUCCINATE 25 MG/1
25 TABLET, EXTENDED RELEASE ORAL DAILY
Qty: 30 TABLET | Refills: 3 | Status: SHIPPED | OUTPATIENT
Start: 2024-02-14

## 2024-02-14 RX ORDER — ATORVASTATIN CALCIUM 80 MG/1
80 TABLET, FILM COATED ORAL NIGHTLY
Qty: 30 TABLET | Refills: 3 | Status: SHIPPED | OUTPATIENT
Start: 2024-02-14

## 2024-02-14 NOTE — TELEPHONE ENCOUNTER
Soham Reyez called requesting a refill on the following medications:  Requested Prescriptions     Pending Prescriptions Disp Refills    aspirin 81 MG chewable tablet 30 tablet 3     Sig: Take 1 tablet by mouth daily    atorvastatin (LIPITOR) 80 MG tablet 30 tablet 3     Sig: Take 1 tablet by mouth nightly    metoprolol succinate (TOPROL XL) 25 MG extended release tablet 30 tablet 3     Sig: Take 1 tablet by mouth daily     Pharmacy verified:[facility]   .pv      Date of last visit: 2/7/24   Date of next visit (if applicable): 8/8/2024

## 2024-06-17 NOTE — TELEPHONE ENCOUNTER
Soham Reyez called requesting a refill on the following medications:  Requested Prescriptions     Pending Prescriptions Disp Refills    aspirin 81 MG chewable tablet 30 tablet 3     Sig: Take 1 tablet by mouth daily    ticagrelor (BRILINTA) 90 MG TABS tablet 60 tablet 3     Sig: Take 1 tablet by mouth 2 times daily    atorvastatin (LIPITOR) 80 MG tablet 30 tablet 3     Sig: Take 1 tablet by mouth nightly    metoprolol succinate (TOPROL XL) 25 MG extended release tablet 30 tablet 3     Sig: Take 1 tablet by mouth daily     Pharmacy verified:[facility] ph. [phone]  .pv      Date of last visit: 02.07.2024  Date of next visit (if applicable): 07.31.2024

## 2024-06-18 RX ORDER — METOPROLOL SUCCINATE 25 MG/1
25 TABLET, EXTENDED RELEASE ORAL DAILY
Qty: 90 TABLET | Refills: 0 | Status: SHIPPED | OUTPATIENT
Start: 2024-06-18

## 2024-06-18 RX ORDER — ASPIRIN 81 MG/1
81 TABLET, CHEWABLE ORAL DAILY
Qty: 90 TABLET | Refills: 0 | Status: SHIPPED | OUTPATIENT
Start: 2024-06-18

## 2024-06-18 RX ORDER — ATORVASTATIN CALCIUM 80 MG/1
80 TABLET, FILM COATED ORAL NIGHTLY
Qty: 90 TABLET | Refills: 0 | Status: SHIPPED | OUTPATIENT
Start: 2024-06-18

## 2024-07-31 ENCOUNTER — OFFICE VISIT (OUTPATIENT)
Dept: CARDIOLOGY CLINIC | Age: 44
End: 2024-07-31
Payer: COMMERCIAL

## 2024-07-31 VITALS
BODY MASS INDEX: 26.1 KG/M2 | HEIGHT: 74 IN | DIASTOLIC BLOOD PRESSURE: 62 MMHG | HEART RATE: 72 BPM | SYSTOLIC BLOOD PRESSURE: 124 MMHG | WEIGHT: 203.4 LBS

## 2024-07-31 DIAGNOSIS — I25.10 CORONARY ARTERY DISEASE INVOLVING NATIVE CORONARY ARTERY OF NATIVE HEART WITHOUT ANGINA PECTORIS: Primary | ICD-10-CM

## 2024-07-31 PROCEDURE — 99213 OFFICE O/P EST LOW 20 MIN: CPT | Performed by: INTERNAL MEDICINE

## 2024-07-31 RX ORDER — METOPROLOL SUCCINATE 25 MG/1
25 TABLET, EXTENDED RELEASE ORAL DAILY
Qty: 90 TABLET | Refills: 3 | Status: SHIPPED | OUTPATIENT
Start: 2024-07-31

## 2024-07-31 RX ORDER — ATORVASTATIN CALCIUM 80 MG/1
80 TABLET, FILM COATED ORAL NIGHTLY
Qty: 90 TABLET | Refills: 3 | Status: SHIPPED | OUTPATIENT
Start: 2024-07-31

## 2024-07-31 NOTE — PROGRESS NOTES
[facility] [place] [facility]  [facility] CARDIOLOGY  [address]  [address]  [facility] [address]  Dept: [phone]  Dept Fax: [phone]  Loc: [phone]    Visit Date: 7/31/2024    Mr. Reyez is a 44 y.o. male  who presented for:  Chief Complaint   Patient presents with    6 Month Follow-Up       HPI:   HPI   45 yo M s/p R PLB STEMI x 1 VICKIE on DAPT who presents for follow-up.  No chest pain, angina, NEIL, orthopnea, PND, sob at rest, palpitations, LE edema, or syncope.    Can do all ADLs.  No side effects.  Some bruising but expected.  He is back to work.  Preserved EF 50-55%.  NTG SL prn - no.   Has not quit smoking.        ECG (if obtained today):  NA      Current Outpatient Medications:     aspirin 81 MG chewable tablet, Take 1 tablet by mouth daily, Disp: 90 tablet, Rfl: 0    ticagrelor (BRILINTA) 90 MG TABS tablet, Take 1 tablet by mouth 2 times daily, Disp: 180 tablet, Rfl: 0    atorvastatin (LIPITOR) 80 MG tablet, Take 1 tablet by mouth nightly, Disp: 90 tablet, Rfl: 0    metoprolol succinate (TOPROL XL) 25 MG extended release tablet, Take 1 tablet by mouth daily, Disp: 90 tablet, Rfl: 0    nitroGLYCERIN (NITROSTAT) 0.4 MG SL tablet, Place 1 tablet under the tongue every 5 minutes as needed for Chest pain up to max of 3 total doses. If no relief after 1 dose, call 911., Disp: 25 tablet, Rfl: 3    amphetamine-dextroamphetamine (ADDERALL XR) 20 MG extended release capsule, Take 2 capsules by mouth daily., Disp: , Rfl:     testosterone cypionate (DEPOTESTOTERONE CYPIONATE) 200 MG/ML injection, Inject 1 mL into the muscle once a week., Disp: , Rfl:     BUPRENORPHINE HCL SL, Place 16 mg under the tongue daily Max Daily Amount: 16 mg, Disp: , Rfl:     therapeutic multivitamin-minerals (THERAGRAN-M) tablet, Take 1 tablet by mouth daily., Disp: 30 tablet, Rfl: 0    Past Medical History  Soham  has a past medical history of Psychiatric problem.    Social History  Soham  reports that he

## 2024-09-24 RX ORDER — METOPROLOL SUCCINATE 25 MG/1
25 TABLET, EXTENDED RELEASE ORAL DAILY
Qty: 90 TABLET | Refills: 3 | Status: SHIPPED | OUTPATIENT
Start: 2024-09-24

## 2024-09-24 RX ORDER — ASPIRIN 81 MG/1
81 TABLET, CHEWABLE ORAL DAILY
Qty: 90 TABLET | Refills: 0 | Status: SHIPPED | OUTPATIENT
Start: 2024-09-24

## 2024-09-24 RX ORDER — ATORVASTATIN CALCIUM 80 MG/1
80 TABLET, FILM COATED ORAL NIGHTLY
Qty: 90 TABLET | Refills: 3 | Status: SHIPPED | OUTPATIENT
Start: 2024-09-24

## 2025-01-31 RX ORDER — CLOPIDOGREL BISULFATE 75 MG/1
75 TABLET ORAL DAILY
COMMUNITY

## 2025-01-31 RX ORDER — CLOPIDOGREL 300 MG/1
600 TABLET, FILM COATED ORAL ONCE
COMMUNITY

## 2025-01-31 RX ORDER — CLOPIDOGREL BISULFATE 75 MG/1
75 TABLET ORAL DAILY
Qty: 90 TABLET | Refills: 3 | Status: SHIPPED | OUTPATIENT
Start: 2025-01-31

## 2025-01-31 NOTE — TELEPHONE ENCOUNTER
Pt had a stent last year, and is calling as brilinta is now costing over 300.00 asking if he can change to somthing different or does he have to take anything?   Pt has enough for saturday only?

## 2025-05-29 ENCOUNTER — TELEPHONE (OUTPATIENT)
Dept: CARDIOLOGY CLINIC | Age: 45
End: 2025-05-29

## 2025-05-29 NOTE — TELEPHONE ENCOUNTER
Pre op Risk Assessment    Procedure DENTAL EXTRACTIONS X3  Physician ALEJO THACKER  Date of surgery/procedure TBD    Last OV 07/2024  Last Stress ?  Last Echo 01/2024  Last Cath 01/2024  Last Stent 01/2024  Is patient on blood thinners PLAVIX, ASA  Hold Meds/how many days ?    FAX TO [phone]

## 2025-07-21 NOTE — PROGRESS NOTES
[facility] [place] [facility]  [facility] CARDIOLOGY  [address]  [address]  [facility] [address]  Dept: [phone]  Dept Fax: [phone]  Loc: [phone]    Visit Date: 7/23/2025    Soham Reyez is a 45 y.o. male who presents todayfor:  Chief Complaint   Patient presents with    1 Year Follow Up     Cardiologist: Umang    Hx of Cad/PCI of PLB, pEF, smoker    HPI:1 year f/u  No chest pain, angina, NEIL, orthopnea, PND, sob at rest, palpitations, LE edema, or syncope.  Still smoking, less than 1 ppd now. Trying to cut down.     Past Surgical History:   Procedure Laterality Date    APPENDECTOMY      CARDIAC PROCEDURE N/A 1/21/2024    Left heart cath / coronary angiography performed by Osman Heck MD at [facility] CARDIAC CATH LAB    CARDIAC PROCEDURE N/A 1/21/2024    Angioplasty coronary performed by Osman Heck MD at [facility] CARDIAC CATH LAB     Family History   Problem Relation Age of Onset    Mental Illness Mother      Social History     Tobacco Use    Smoking status: Every Day     Current packs/day: 1.00     Average packs/day: 1 pack/day for 36.6 years (36.6 ttl pk-yrs)     Types: Cigarettes     Start date: 1989    Smokeless tobacco: Current     Types: Snuff   Substance Use Topics    Alcohol use: Yes     Comment: OCCASIONAL      Current Outpatient Medications   Medication Sig Dispense Refill    clopidogrel (PLAVIX) 300 MG TABS Take 2 tablets by mouth once      clopidogrel (PLAVIX) 75 MG tablet Take 1 tablet by mouth daily      clopidogrel (PLAVIX) 75 MG tablet Take 1 tablet by mouth daily 90 tablet 3    atorvastatin (LIPITOR) 80 MG tablet Take 1 tablet by mouth nightly 90 tablet 3    metoprolol succinate (TOPROL XL) 25 MG extended release tablet Take 1 tablet by mouth daily 90 tablet 3    aspirin 81 MG chewable tablet Take 1 tablet by mouth daily 90 tablet 0    nitroGLYCERIN (NITROSTAT) 0.4 MG SL tablet Place 1 tablet under the tongue every 5 minutes as needed for Chest pain up to

## 2025-07-23 ENCOUNTER — OFFICE VISIT (OUTPATIENT)
Dept: CARDIOLOGY CLINIC | Age: 45
End: 2025-07-23
Payer: COMMERCIAL

## 2025-07-23 VITALS
HEIGHT: 74 IN | SYSTOLIC BLOOD PRESSURE: 128 MMHG | DIASTOLIC BLOOD PRESSURE: 80 MMHG | WEIGHT: 215.4 LBS | HEART RATE: 75 BPM | BODY MASS INDEX: 27.64 KG/M2

## 2025-07-23 DIAGNOSIS — Z71.6 TOBACCO ABUSE COUNSELING: ICD-10-CM

## 2025-07-23 DIAGNOSIS — I25.10 CORONARY ARTERY DISEASE INVOLVING NATIVE CORONARY ARTERY OF NATIVE HEART WITHOUT ANGINA PECTORIS: ICD-10-CM

## 2025-07-23 DIAGNOSIS — I21.11 ST ELEVATION MYOCARDIAL INFARCTION INVOLVING RIGHT CORONARY ARTERY (HCC): Primary | ICD-10-CM

## 2025-07-23 PROCEDURE — 99214 OFFICE O/P EST MOD 30 MIN: CPT | Performed by: STUDENT IN AN ORGANIZED HEALTH CARE EDUCATION/TRAINING PROGRAM

## 2025-07-23 PROCEDURE — 93000 ELECTROCARDIOGRAM COMPLETE: CPT | Performed by: STUDENT IN AN ORGANIZED HEALTH CARE EDUCATION/TRAINING PROGRAM

## 2025-07-30 ENCOUNTER — TELEPHONE (OUTPATIENT)
Dept: CARDIOLOGY CLINIC | Age: 45
End: 2025-07-30

## 2025-07-30 NOTE — TELEPHONE ENCOUNTER
Pt LM on nurse line asking if he can hold Plavix for 5 days for dental procedure on 8/8?  Last PCI 1/21/24      LM for patient to return call-who is the dentist? Do they need a form faxed?

## (undated) DEVICE — Device

## (undated) DEVICE — CATHETER ANGIO 5FR L100CM GRY S STL NYL JL3.5 3 SEG BRAID L

## (undated) DEVICE — DEVICE INFL 20ML 30ATM POLYCARB BRL ABS PLUNG DGT DISPLAY

## (undated) DEVICE — CATH BLLN ANGIO 3X8MM NC EUPHORIA RX

## (undated) DEVICE — GLIDESHEATH SLENDER NITINOL HYDROPHILIC COATED INTRODUCER SHEATH: Brand: GLIDESHEATH SLENDER

## (undated) DEVICE — CATHETER GUID 6FR DIA0.071IN SHFT NYL STD L JR 4 CRV ENH

## (undated) DEVICE — VALVE HEMSTAS W/ GWIRE INSRTN TOOL GRDIAN II NC

## (undated) DEVICE — GUIDEWIRE VASC L260CM DIA0.035IN RAD 3MM J TIP L7CM PTFE

## (undated) DEVICE — CATH BLLN ANGIO 3.50X8MM NC EUPHORIA RX

## (undated) DEVICE — RUNTHROUGH NS EXTRA FLOPPY PTCA GUIDEWIRE: Brand: RUNTHROUGH

## (undated) DEVICE — CATH BLLN ANGIO 3X12MM NC EUPHORIA RX

## (undated) DEVICE — CATHETER DIAG AD 5FR L110CM 145DEG COR GRY HYDRPHLC NYL